# Patient Record
Sex: MALE | Race: BLACK OR AFRICAN AMERICAN | NOT HISPANIC OR LATINO | URBAN - METROPOLITAN AREA
[De-identification: names, ages, dates, MRNs, and addresses within clinical notes are randomized per-mention and may not be internally consistent; named-entity substitution may affect disease eponyms.]

---

## 2023-01-06 ENCOUNTER — EMERGENCY (EMERGENCY)
Facility: HOSPITAL | Age: 38
LOS: 1 days | Discharge: HOME | End: 2023-01-06
Admitting: EMERGENCY MEDICINE
Payer: SELF-PAY

## 2023-01-06 VITALS
TEMPERATURE: 99 F | HEART RATE: 88 BPM | DIASTOLIC BLOOD PRESSURE: 83 MMHG | OXYGEN SATURATION: 95 % | RESPIRATION RATE: 16 BRPM | SYSTOLIC BLOOD PRESSURE: 150 MMHG

## 2023-01-06 PROCEDURE — 99053 MED SERV 10PM-8AM 24 HR FAC: CPT

## 2023-01-06 PROCEDURE — 99284 EMERGENCY DEPT VISIT MOD MDM: CPT

## 2023-01-06 RX ORDER — PSEUDOEPHEDRINE HCL 30 MG
30 TABLET ORAL ONCE
Refills: 0 | Status: COMPLETED | OUTPATIENT
Start: 2023-01-06 | End: 2023-01-06

## 2023-01-06 RX ORDER — IBUPROFEN 200 MG
600 TABLET ORAL ONCE
Refills: 0 | Status: COMPLETED | OUTPATIENT
Start: 2023-01-06 | End: 2023-01-06

## 2023-01-06 RX ADMIN — Medication 600 MILLIGRAM(S): at 03:38

## 2023-01-06 RX ADMIN — Medication 30 MILLIGRAM(S): at 03:38

## 2023-01-06 NOTE — ED PROVIDER NOTE - PHYSICAL EXAMINATION
Physical Exam    Vital Signs: I have reviewed the initial vital signs.  Constitutional: well-nourished, appears stated age, no acute distress  Eyes: PERRLA, EOM intact, RAPD absent, conjunctiva clear and symmetrical lids.  ENT: neck supple with no adenopathy, moist MM, no exudates, no erythema, +rhinorrhea, no ttp over the sinuses  Cardiovascular: +S1/S2, no murmurs, regular rate, regular rhythm, well-perfused extremities  Respiratory: unlabored respiratory effort, clear to auscultation bilaterally, speaks in full sentences  Gastrointestinal: soft, non-tender abdomen

## 2023-01-06 NOTE — ED ADULT TRIAGE NOTE - CHIEF COMPLAINT QUOTE
Pt c/o chest congestion and chest discomfort, coughing, sneezing, body aches, and chills x3 days. Pt states "I feel like shit". Pt denies SOB or PMH.

## 2023-01-06 NOTE — ED PROVIDER NOTE - CLINICAL SUMMARY MEDICAL DECISION MAKING FREE TEXT BOX
pt here with viral like illness with rhinorrhea, sore throat and body aches for 3 d in duration, +rhinorhea on exam otherwise normal exam.

## 2023-01-06 NOTE — ED ADULT NURSE NOTE - OBJECTIVE STATEMENT
Patient presents to ED c/o chest discomfort, congestion, coughing, body aches, and chills x 3 days. Patient states that he is relatively healthy and does not feel well. Denies pmh. Denies sob, n/v/d. Patient aox4, ambulatory with steady gait, spont unlabored breathing on ra.

## 2023-01-06 NOTE — ED PROVIDER NOTE - NS ED ROS FT
Review of Systems    Constitutional: (-) fever (-) weakness (-) diaphoresis   Eyes: (-) change in vision (-) photophobia (-) eye pain  ENT: (-) ear ache  Cardiovascular: (-) chest pain  (-) palpitations  Respiratory: (-) SOB (-) cough   GI: (-) abdominal pain (-) N/V (-) diarrhea  Integumentary: (-) rash (-) redness   Neurological:  (-) focal deficit (-) altered mental status

## 2023-01-06 NOTE — ED PROVIDER NOTE - PATIENT PORTAL LINK FT
You can access the FollowMyHealth Patient Portal offered by [facility] by registering at the following website: [email or web address]. By joining [employer]’s FollowMyHealth portal, you will also be able to view your health information using other applications (apps) compatible with our system.

## 2023-01-06 NOTE — ED PROVIDER NOTE - OBJECTIVE STATEMENT
36 yo m pw rhinorrhea and sinusoid pain frontal and maxillary with body aches, dry cough and sore throat aching mod in severity ongoing for 3 d in duration w/o any provoking or modifying factors.     I have reviewed available current nursing and previous documentation of past medical, surgical, family, and/or social history.

## 2023-01-09 DIAGNOSIS — J34.89 OTHER SPECIFIED DISORDERS OF NOSE AND NASAL SINUSES: ICD-10-CM

## 2023-01-09 DIAGNOSIS — J06.9 ACUTE UPPER RESPIRATORY INFECTION, UNSPECIFIED: ICD-10-CM

## 2024-08-08 NOTE — ED ADULT NURSE NOTE - CAS TRG GENERAL NORM CIRC DET
Please put in scheduling notes and route to Maeve. Thank you! Given the significant and immediate threats to this patient based on initial presentation, the benefits of emergency contrast-enhanced CT imaging without obtaining GFR/creatinine serum level results greatly outweigh the potential risk of harm due inability to consent for procedure 2/2 altered mental status. Strong peripheral pulses

## 2024-10-31 NOTE — ED ADULT TRIAGE NOTE - GLASGOW COMA SCALE: BEST VERBAL RESPONSE, MLM
WEIGHT LOSS PLAN    1.  Read food labels and count calories.  Goal 0050-1323 calories daily for women and 4966-6871 calories daily for men as you are weaning off sugar and preparing to start a Low Calorie Dietary Approach.  Achieve this by decreasing caloric intake by 500 calories by weekly increments.  NOTE:  1 pound of fat = 3500 kilocalories!    Www.loseit.[employer]  Www[employer]  Www.[employer]  Www.[employer].gov  Weight watchers mobile    Calories needed to lose 1-2 pounds a week = 10 x your weight in pounds    2.  Increase water intake.    Per [employer] [facility], it is important to drink 1/2 your body weight in ounces of water daily.  Decrease your water consumption 2-3 hours before bedtime to prevent sleep disturbance from frequent urination.    3.  Decrease sugary beverages.  Each can or glass of soda increases your risk of obesity by 60%.  You can lose 10 pounds in a month by avoiding any soda.     12 oz can of soda = 140 calories, 16 oz cup of sweet tea = 200 calories    16 oz orange juice = 200 calories, 10 oz apple juice = 150 calories   32 oz sports drink = 200 calories, 16 oz punch = 240 calories   3.5 oz alcohol = 100-150 calories     4.  Avoid High Fructose Corn Syrup products.  This ingredient makes products highly addictive!    5.  Exercise 30 minutes daily 5 days weekly, minimally.  If you burn 3500 calories that equals a pound!  Try using a pedometer  or a smart watch to your count steps. Visit www.[employer] for a free pedometer and diet recommendations.      Your maximum heart rate = 220 - your age    Never exercise at your maximum heart rate.    See handout for target heart rate.    6.  Decrease carbohydrates (white bread, pasta, rice, potatoes, sweet foods and sweet drinks like soda, tea, coffee, juice and sports drinks).  Increase fiber and protein.    Goal:   calories daily of carbohydrates     Try CHROMIUM PICONATE 200 MG THREE TIMES DAILY,    to 
(V5) oriented